# Patient Record
Sex: MALE | Race: WHITE | Employment: OTHER | URBAN - NONMETROPOLITAN AREA
[De-identification: names, ages, dates, MRNs, and addresses within clinical notes are randomized per-mention and may not be internally consistent; named-entity substitution may affect disease eponyms.]

---

## 2022-03-20 PROBLEM — M53.3 SACROILIAC JOINT DYSFUNCTION: Status: ACTIVE | Noted: 2017-01-09

## 2024-05-13 ENCOUNTER — APPOINTMENT (OUTPATIENT)
Age: 69
DRG: 689 | End: 2024-05-13
Payer: MEDICARE

## 2024-05-13 ENCOUNTER — HOSPITAL ENCOUNTER (INPATIENT)
Age: 69
LOS: 2 days | Discharge: ANOTHER HOSPITAL | DRG: 689 | End: 2024-05-15
Attending: EMERGENCY MEDICINE | Admitting: INTERNAL MEDICINE
Payer: MEDICARE

## 2024-05-13 DIAGNOSIS — N39.0 RECURRENT UTI: ICD-10-CM

## 2024-05-13 DIAGNOSIS — E87.0 HYPERNATREMIA: Primary | ICD-10-CM

## 2024-05-13 PROBLEM — R41.82 AMS (ALTERED MENTAL STATUS): Status: ACTIVE | Noted: 2024-05-13

## 2024-05-13 PROBLEM — E87.1 HYPONATREMIA: Status: ACTIVE | Noted: 2024-05-13

## 2024-05-13 LAB
ALBUMIN SERPL-MCNC: 1.5 G/DL (ref 3.4–5)
ALBUMIN/GLOB SERPL: 0.3 (ref 0.8–1.7)
ALP SERPL-CCNC: 178 U/L (ref 45–117)
ALT SERPL-CCNC: 19 U/L (ref 16–61)
AMMONIA PLAS-SCNC: 61 UMOL/L (ref 11–32)
ANION GAP SERPL CALC-SCNC: 10 MMOL/L (ref 3–18)
APPEARANCE UR: ABNORMAL
AST SERPL W P-5'-P-CCNC: 33 U/L (ref 10–38)
BACTERIA URNS QL MICRO: ABNORMAL /HPF
BACTERIA URNS QL MICRO: ABNORMAL /HPF
BASOPHILS # BLD: 0 K/UL (ref 0–0.1)
BASOPHILS NFR BLD: 0 % (ref 0–2)
BILIRUB SERPL-MCNC: 1.9 MG/DL (ref 0.2–1)
BILIRUB UR QL: NEGATIVE
BILIRUB UR QL: NEGATIVE
BUN SERPL-MCNC: 27 MG/DL (ref 7–18)
BUN/CREAT SERPL: 18 (ref 12–20)
CA-I BLD-MCNC: 8.1 MG/DL (ref 8.5–10.1)
CHLORIDE SERPL-SCNC: 77 MMOL/L (ref 100–111)
CO2 SERPL-SCNC: 32 MMOL/L (ref 21–32)
COLOR UR: ABNORMAL
COLOR UR: YELLOW
CREAT SERPL-MCNC: 1.49 MG/DL (ref 0.6–1.3)
DIFFERENTIAL METHOD BLD: ABNORMAL
EOSINOPHIL # BLD: 1.8 K/UL (ref 0–0.4)
EOSINOPHIL NFR BLD: 14 % (ref 0–5)
EPITH CASTS URNS QL MICRO: ABNORMAL /LPF (ref 0–20)
ERYTHROCYTE [DISTWIDTH] IN BLOOD BY AUTOMATED COUNT: 17.1 % (ref 11.6–14.5)
FLUAV AG NPH QL IA: NEGATIVE
FLUBV AG NOSE QL IA: NEGATIVE
GLOBULIN SER CALC-MCNC: 5.5 G/DL (ref 2–4)
GLUCOSE SERPL-MCNC: 107 MG/DL (ref 74–99)
GLUCOSE UR STRIP.AUTO-MCNC: NEGATIVE MG/DL
GLUCOSE UR STRIP.AUTO-MCNC: NEGATIVE MG/DL
HCT VFR BLD AUTO: 24.2 % (ref 36–48)
HGB BLD-MCNC: 8.2 G/DL (ref 13–16)
HGB UR QL STRIP: ABNORMAL
IMM GRANULOCYTES # BLD AUTO: 0 K/UL
IMM GRANULOCYTES NFR BLD AUTO: 0 %
KETONES UR QL STRIP.AUTO: NEGATIVE MG/DL
KETONES UR QL STRIP.AUTO: NEGATIVE MG/DL
LEUKOCYTE ESTERASE UR QL STRIP.AUTO: ABNORMAL
LEUKOCYTE ESTERASE UR QL STRIP.AUTO: ABNORMAL
LYMPHOCYTES # BLD: 1 K/UL (ref 0.9–3.6)
LYMPHOCYTES NFR BLD: 8 % (ref 21–52)
MCH RBC QN AUTO: 29.1 PG (ref 24–34)
MCHC RBC AUTO-ENTMCNC: 33.9 G/DL (ref 31–37)
MCV RBC AUTO: 85.8 FL (ref 78–100)
MONOCYTES # BLD: 0.6 K/UL (ref 0.05–1.2)
MONOCYTES NFR BLD: 5 % (ref 3–10)
NEUTS SEG # BLD: 9.4 K/UL (ref 1.8–8)
NEUTS SEG NFR BLD: 73 % (ref 40–73)
NITRITE UR QL STRIP.AUTO: NEGATIVE
NITRITE UR QL STRIP.AUTO: NEGATIVE
NRBC # BLD: 0 K/UL (ref 0–0.01)
NRBC BLD-RTO: 0 PER 100 WBC
PH UR STRIP: 5.5 (ref 5–8)
PH UR STRIP: 5.5 (ref 5–8)
PLATELET # BLD AUTO: 176 K/UL (ref 135–420)
PMV BLD AUTO: 9.2 FL (ref 9.2–11.8)
POTASSIUM SERPL-SCNC: 3.8 MMOL/L (ref 3.5–5.5)
PROCALCITONIN SERPL-MCNC: 0.21 NG/ML
PROT SERPL-MCNC: 7 G/DL (ref 6.4–8.2)
PROT UR STRIP-MCNC: 30 MG/DL
PROT UR STRIP-MCNC: ABNORMAL MG/DL
RBC # BLD AUTO: 2.82 M/UL (ref 4.35–5.65)
RBC #/AREA URNS HPF: >100 /HPF (ref 0–2)
RBC #/AREA URNS HPF: ABNORMAL /HPF (ref 0–2)
RBC MORPH BLD: ABNORMAL
SARS-COV-2 RDRP RESP QL NAA+PROBE: NOT DETECTED
SODIUM SERPL-SCNC: 119 MMOL/L (ref 136–145)
SP GR UR REFRACTOMETRY: 1.02 (ref 1–1.03)
SP GR UR REFRACTOMETRY: 1.02 (ref 1–1.03)
TROPONIN I SERPL HS-MCNC: 6 NG/L (ref 0–78)
URINE CULTURE IF INDICATED: ABNORMAL
UROBILINOGEN UR QL STRIP.AUTO: 1 EU/DL (ref 0.2–1)
UROBILINOGEN UR QL STRIP.AUTO: 1 EU/DL (ref 0.2–1)
WBC # BLD AUTO: 12.8 K/UL (ref 4.6–13.2)
WBC URNS QL MICRO: ABNORMAL /HPF (ref 0–4)

## 2024-05-13 PROCEDURE — 2000000000 HC ICU R&B

## 2024-05-13 PROCEDURE — 94640 AIRWAY INHALATION TREATMENT: CPT

## 2024-05-13 PROCEDURE — 87040 BLOOD CULTURE FOR BACTERIA: CPT

## 2024-05-13 PROCEDURE — 87635 SARS-COV-2 COVID-19 AMP PRB: CPT

## 2024-05-13 PROCEDURE — 2700000000 HC OXYGEN THERAPY PER DAY

## 2024-05-13 PROCEDURE — 70450 CT HEAD/BRAIN W/O DYE: CPT

## 2024-05-13 PROCEDURE — 85025 COMPLETE CBC W/AUTO DIFF WBC: CPT

## 2024-05-13 PROCEDURE — 6370000000 HC RX 637 (ALT 250 FOR IP): Performed by: INTERNAL MEDICINE

## 2024-05-13 PROCEDURE — P9047 ALBUMIN (HUMAN), 25%, 50ML: HCPCS | Performed by: INTERNAL MEDICINE

## 2024-05-13 PROCEDURE — 51798 US URINE CAPACITY MEASURE: CPT

## 2024-05-13 PROCEDURE — 84484 ASSAY OF TROPONIN QUANT: CPT

## 2024-05-13 PROCEDURE — 80053 COMPREHEN METABOLIC PANEL: CPT

## 2024-05-13 PROCEDURE — 99285 EMERGENCY DEPT VISIT HI MDM: CPT

## 2024-05-13 PROCEDURE — 82140 ASSAY OF AMMONIA: CPT

## 2024-05-13 PROCEDURE — 84145 PROCALCITONIN (PCT): CPT

## 2024-05-13 PROCEDURE — 96374 THER/PROPH/DIAG INJ IV PUSH: CPT

## 2024-05-13 PROCEDURE — 6360000002 HC RX W HCPCS: Performed by: EMERGENCY MEDICINE

## 2024-05-13 PROCEDURE — 87804 INFLUENZA ASSAY W/OPTIC: CPT

## 2024-05-13 PROCEDURE — 51701 INSERT BLADDER CATHETER: CPT

## 2024-05-13 PROCEDURE — 2580000003 HC RX 258: Performed by: INTERNAL MEDICINE

## 2024-05-13 PROCEDURE — 6360000002 HC RX W HCPCS: Performed by: INTERNAL MEDICINE

## 2024-05-13 PROCEDURE — 93005 ELECTROCARDIOGRAM TRACING: CPT | Performed by: EMERGENCY MEDICINE

## 2024-05-13 PROCEDURE — 51702 INSERT TEMP BLADDER CATH: CPT

## 2024-05-13 PROCEDURE — 94761 N-INVAS EAR/PLS OXIMETRY MLT: CPT

## 2024-05-13 PROCEDURE — 71045 X-RAY EXAM CHEST 1 VIEW: CPT

## 2024-05-13 PROCEDURE — 81001 URINALYSIS AUTO W/SCOPE: CPT

## 2024-05-13 PROCEDURE — 2580000003 HC RX 258: Performed by: EMERGENCY MEDICINE

## 2024-05-13 PROCEDURE — 87086 URINE CULTURE/COLONY COUNT: CPT

## 2024-05-13 RX ORDER — SODIUM CHLORIDE 9 MG/ML
INJECTION, SOLUTION INTRAVENOUS PRN
Status: DISCONTINUED | OUTPATIENT
Start: 2024-05-13 | End: 2024-05-15 | Stop reason: HOSPADM

## 2024-05-13 RX ORDER — MIDODRINE HYDROCHLORIDE 5 MG/1
10 TABLET ORAL
Status: DISCONTINUED | OUTPATIENT
Start: 2024-05-13 | End: 2024-05-15 | Stop reason: HOSPADM

## 2024-05-13 RX ORDER — SODIUM CHLORIDE 0.9 % (FLUSH) 0.9 %
5-40 SYRINGE (ML) INJECTION EVERY 12 HOURS SCHEDULED
Status: DISCONTINUED | OUTPATIENT
Start: 2024-05-13 | End: 2024-05-15 | Stop reason: HOSPADM

## 2024-05-13 RX ORDER — ACETAMINOPHEN 650 MG/1
650 SUPPOSITORY RECTAL EVERY 6 HOURS PRN
Status: DISCONTINUED | OUTPATIENT
Start: 2024-05-13 | End: 2024-05-15 | Stop reason: HOSPADM

## 2024-05-13 RX ORDER — GABAPENTIN 300 MG/1
300 CAPSULE ORAL 2 TIMES DAILY
Status: DISCONTINUED | OUTPATIENT
Start: 2024-05-13 | End: 2024-05-15 | Stop reason: HOSPADM

## 2024-05-13 RX ORDER — ENOXAPARIN SODIUM 100 MG/ML
30 INJECTION SUBCUTANEOUS 2 TIMES DAILY
Status: DISCONTINUED | OUTPATIENT
Start: 2024-05-13 | End: 2024-05-15 | Stop reason: HOSPADM

## 2024-05-13 RX ORDER — 0.9 % SODIUM CHLORIDE 0.9 %
30 INTRAVENOUS SOLUTION INTRAVENOUS ONCE
Status: COMPLETED | OUTPATIENT
Start: 2024-05-13 | End: 2024-05-13

## 2024-05-13 RX ORDER — ATORVASTATIN CALCIUM 40 MG/1
40 TABLET, FILM COATED ORAL DAILY
Status: DISCONTINUED | OUTPATIENT
Start: 2024-05-13 | End: 2024-05-15 | Stop reason: HOSPADM

## 2024-05-13 RX ORDER — PANTOPRAZOLE SODIUM 40 MG/1
40 TABLET, DELAYED RELEASE ORAL
Status: DISCONTINUED | OUTPATIENT
Start: 2024-05-14 | End: 2024-05-15 | Stop reason: HOSPADM

## 2024-05-13 RX ORDER — ACETAMINOPHEN 325 MG/1
650 TABLET ORAL EVERY 6 HOURS PRN
Status: DISCONTINUED | OUTPATIENT
Start: 2024-05-13 | End: 2024-05-15 | Stop reason: HOSPADM

## 2024-05-13 RX ORDER — MULTIVITAMIN WITH IRON
1 TABLET ORAL DAILY
Status: DISCONTINUED | OUTPATIENT
Start: 2024-05-13 | End: 2024-05-15 | Stop reason: HOSPADM

## 2024-05-13 RX ORDER — POLYETHYLENE GLYCOL 3350 17 G/17G
17 POWDER, FOR SOLUTION ORAL DAILY PRN
Status: DISCONTINUED | OUTPATIENT
Start: 2024-05-13 | End: 2024-05-15 | Stop reason: HOSPADM

## 2024-05-13 RX ORDER — ALBUMIN (HUMAN) 12.5 G/50ML
25 SOLUTION INTRAVENOUS ONCE
Status: COMPLETED | OUTPATIENT
Start: 2024-05-13 | End: 2024-05-13

## 2024-05-13 RX ORDER — ONDANSETRON 2 MG/ML
4 INJECTION INTRAMUSCULAR; INTRAVENOUS EVERY 6 HOURS PRN
Status: DISCONTINUED | OUTPATIENT
Start: 2024-05-13 | End: 2024-05-15 | Stop reason: HOSPADM

## 2024-05-13 RX ORDER — FUROSEMIDE 20 MG/1
20 TABLET ORAL DAILY
Status: DISCONTINUED | OUTPATIENT
Start: 2024-05-13 | End: 2024-05-15 | Stop reason: HOSPADM

## 2024-05-13 RX ORDER — LACTULOSE 10 G/15ML
30 SOLUTION ORAL 3 TIMES DAILY
Status: DISCONTINUED | OUTPATIENT
Start: 2024-05-13 | End: 2024-05-15 | Stop reason: HOSPADM

## 2024-05-13 RX ORDER — DULOXETIN HYDROCHLORIDE 20 MG/1
40 CAPSULE, DELAYED RELEASE ORAL DAILY
Status: DISCONTINUED | OUTPATIENT
Start: 2024-05-13 | End: 2024-05-15 | Stop reason: HOSPADM

## 2024-05-13 RX ORDER — TAMSULOSIN HYDROCHLORIDE 0.4 MG/1
0.8 CAPSULE ORAL DAILY
Status: DISCONTINUED | OUTPATIENT
Start: 2024-05-13 | End: 2024-05-15 | Stop reason: HOSPADM

## 2024-05-13 RX ORDER — BACLOFEN 10 MG/1
10 TABLET ORAL 3 TIMES DAILY
Status: DISCONTINUED | OUTPATIENT
Start: 2024-05-13 | End: 2024-05-15 | Stop reason: HOSPADM

## 2024-05-13 RX ORDER — ONDANSETRON 4 MG/1
4 TABLET, ORALLY DISINTEGRATING ORAL EVERY 8 HOURS PRN
Status: DISCONTINUED | OUTPATIENT
Start: 2024-05-13 | End: 2024-05-15 | Stop reason: HOSPADM

## 2024-05-13 RX ORDER — SODIUM CHLORIDE 0.9 % (FLUSH) 0.9 %
5-40 SYRINGE (ML) INJECTION PRN
Status: DISCONTINUED | OUTPATIENT
Start: 2024-05-13 | End: 2024-05-15 | Stop reason: HOSPADM

## 2024-05-13 RX ORDER — ASPIRIN 81 MG/1
81 TABLET ORAL DAILY
Status: DISCONTINUED | OUTPATIENT
Start: 2024-05-13 | End: 2024-05-15 | Stop reason: HOSPADM

## 2024-05-13 RX ADMIN — SODIUM CHLORIDE, PRESERVATIVE FREE 10 ML: 5 INJECTION INTRAVENOUS at 21:33

## 2024-05-13 RX ADMIN — WATER 1000 MG: 1 INJECTION INTRAMUSCULAR; INTRAVENOUS; SUBCUTANEOUS at 11:46

## 2024-05-13 RX ADMIN — LACTULOSE 30 G: 10 SOLUTION ORAL at 21:28

## 2024-05-13 RX ADMIN — ALBUMIN (HUMAN) 25 G: 0.25 INJECTION, SOLUTION INTRAVENOUS at 16:34

## 2024-05-13 RX ADMIN — MIDODRINE HYDROCHLORIDE 10 MG: 5 TABLET ORAL at 16:38

## 2024-05-13 RX ADMIN — ATORVASTATIN CALCIUM 40 MG: 40 TABLET, FILM COATED ORAL at 16:38

## 2024-05-13 RX ADMIN — SODIUM CHLORIDE 3741 ML: 9 INJECTION, SOLUTION INTRAVENOUS at 08:20

## 2024-05-13 RX ADMIN — MOMETASONE FUROATE AND FORMOTEROL FUMARATE DIHYDRATE 2 PUFF: 100; 5 AEROSOL RESPIRATORY (INHALATION) at 19:15

## 2024-05-13 RX ADMIN — ASPIRIN 81 MG: 81 TABLET, COATED ORAL at 16:38

## 2024-05-13 RX ADMIN — BACLOFEN 10 MG: 10 TABLET ORAL at 21:27

## 2024-05-13 RX ADMIN — ENOXAPARIN SODIUM 30 MG: 100 INJECTION SUBCUTANEOUS at 21:30

## 2024-05-13 RX ADMIN — THERA TABS 1 TABLET: TAB at 17:33

## 2024-05-13 RX ADMIN — GABAPENTIN 300 MG: 300 CAPSULE ORAL at 21:25

## 2024-05-13 RX ADMIN — LACTULOSE 30 G: 10 SOLUTION ORAL at 16:39

## 2024-05-13 RX ADMIN — BACLOFEN 10 MG: 10 TABLET ORAL at 16:38

## 2024-05-13 RX ADMIN — FUROSEMIDE 20 MG: 20 TABLET ORAL at 16:38

## 2024-05-13 RX ADMIN — DULOXETINE HYDROCHLORIDE 40 MG: 20 CAPSULE, DELAYED RELEASE ORAL at 16:38

## 2024-05-13 RX ADMIN — TAMSULOSIN HYDROCHLORIDE 0.8 MG: 0.4 CAPSULE ORAL at 16:38

## 2024-05-13 ASSESSMENT — PAIN - FUNCTIONAL ASSESSMENT: PAIN_FUNCTIONAL_ASSESSMENT: 0-10

## 2024-05-13 ASSESSMENT — LIFESTYLE VARIABLES
HOW OFTEN DO YOU HAVE A DRINK CONTAINING ALCOHOL: NEVER
HOW MANY STANDARD DRINKS CONTAINING ALCOHOL DO YOU HAVE ON A TYPICAL DAY: PATIENT DOES NOT DRINK

## 2024-05-13 ASSESSMENT — PAIN SCALES - GENERAL
PAINLEVEL_OUTOF10: 0

## 2024-05-13 NOTE — ED PROVIDER NOTES
[facility] EMERGENCY DEPT  EMERGENCY DEPARTMENT ENCOUNTER    Patient Name: Branodn Stanley  MRN: [id number]  YOB: 1955  Provider: Dario Mazariegos DO  PCP: Lopresti, Bartholomew M, MD   Time/Date of evaluation: 8:28 AM EDT on 5/13/24    History of Presenting Illness     History Provided by: Family member  History is limited by: Nothing     HISTORY:   Brandon Stanley is a 68 y.o. male brought in by EMS from home with a chief complaint of fatigue/altered mental status.  Patient has a history of cirrhosis of the liver, chronic hypotension currently on midodrine, and recurrent urinary tract infection.  Patient's wife states that he was recently seen at [facility] 2 days ago.  Patient undergoes weekly paracentesis and his last paracentesis was 4 days ago.  Patient's wife states that he has had decreased appetite and not eating.  Patient also has history of hepatic encephalopathy.    Nursing Notes were all reviewed and agreed with or any disagreements were addressed in the HPI.    Past History     PAST MEDICAL HISTORY:  Past Medical History:   Diagnosis Date    Atrial fibrillation (HCC)     Blood clot in vein     Crushing injury of pelvic region     ED (erectile dysfunction)     Incomplete emptying of bladder     Lower urinary tract symptoms (LUTS)     Male erectile dysfunction, unspecified     Recurrent UTI     Slowing, urinary stream     Urinary retention     Urinary tract infection, site not specified        PAST SURGICAL HISTORY:  Past Surgical History:   Procedure Laterality Date    ANGIOPLASTY  8/2014    APPENDECTOMY  5/2001    COLONOSCOPY  02/07/2015    CORONARY ANGIOPLASTY WITH STENT PLACEMENT  1/2011    FRACTURE SURGERY  01/25/2015    HERNIA REPAIR  2005    OTHER SURGICAL HISTORY  1/2015    Multiple surgieries [facility]    OTHER SURGICAL HISTORY  01/25/2015    APPLICATION EXTERNAL FIXATOR    OTHER SURGICAL HISTORY  01/09/2017    PELVIS/ HIP JOINT-ARTHRODESIS    OTHER SURGICAL HISTORY  01/25/2015

## 2024-05-13 NOTE — H&P
HISTORY AND PHYSICAL EXAMINATION    Admit Date: 5/13/2024  Date of Note:  @TDR@  Assessment / Plans     Principle Problems:  Metabolic encephalopathy  Hyperammoniemia   Hyponatremia - chronic   Advanced Liver Cirrhosis  Ascites   - Presented with complain of confuse, AMS, paracentesis and followed weekly at [facility]   - may related to hyperammonia vs drug induced   - In the ED afebrile, hypotensive, O2 sat 97%, Na 119, NH3 61, Cr 1.49, albumin 1.5, Liver enzyme normal, bili 1.9, WBC 12K, Hgb 8.2, CT head no acute process,   - Admitted to ICU, tele monitor, fluid restriction , IV albumin, Diuresis, monitor Lytes, Rocephin, lactulose  - the family want him to go to [facility] but the EMS brought him her and they agree with admission for observation and monitor.   Hypotension - on Midodrine   HLP - on statin  GERD - on PPI  Chronic pain- cont' with home regimen  COPD - on inhalers   BPH- on Flomax     Code Status: No Order   DVT prophylaxis: pharmacologic and mechanical    Attestation : Brandon Stanley is being admitted for the above principle medical problem.   The patient  is at risk for decompensation and  will require a time span of at least 2 midnights in hospital to continue workup and ensure medical stablility.  Complex decision making was performed, which includes reviewing the patient's available past medical records, laboratory results, and x-ray films.    Subjective:   PCP: Lopresti, Bartholomew M, MD    CC: Senthil STANLEY is a 68 y.o. male     The patient PMH significant for advanced liver cirrhosis, Ascites, malnutrition, hypotension  Presented to [facility] with complain of  AMS, paracentesis and followed weekly at [facility] , per family  Denies recent trauma, denies fever or chills, denies cough or sputum, denies chest pain or palpitation,  Denies SOB or MEYER, denies GI/ symptoms, denies Headache, facial droop or focal weakness/numbness to extremities.  No other symptoms on the review of systems. Other

## 2024-05-13 NOTE — PROGRESS NOTES
1245- ICU bed 1 set up    1250- TRANSFER - IN REPORT:    Verbal report received from Sammi miramontes Brandon Stanley  being received from ER for change in patient condition (Hyponatremia)      Report consisted of patient's Situation, Background, Assessment and   Recommendations(SBAR).     Information from the following report(s) Nurse Handoff Report, Index, ED Encounter Summary, ED SBAR, Adult Overview, Intake/Output, MAR, Recent Results, Med Rec Status, Cardiac Rhythm SR, and Alarm Parameters was reviewed with the receiving nurse.    Opportunity for questions and clarification was provided.      1220- Assessment completed upon patient's arrival to unit and care assumed.      1650- bladder scan done - 289 ml scanned - unable to void spontaneously - notified Dr Witt - order received for fernandez cath, education provided for spouse and client.    1700- fernandez inserted, tolerated well, ginny clear urine, urine collected for lab, assisted with dinner, med's given crushed and with apple sauce.     1720- set up for dinner, total assist.     1900- report given.

## 2024-05-13 NOTE — ED NOTES
TRANSFER - OUT REPORT:    Verbal report given to Sahara READ on Brandon Stanley  being transferred to ICU #1 for routine progression of patient care       Report consisted of patient's Situation, Background, Assessment and   Recommendations(SBAR).     Information from the following report(s) Nurse Handoff Report, Intake/Output, MAR, Recent Results, and Cardiac Rhythm SR  was reviewed with the receiving nurse.    Rowland Fall Assessment:    Presents to emergency department  because of falls (Syncope, seizure, or loss of consciousness): No  Age > 70: No  Altered Mental Status, Intoxication with alcohol or substance confusion (Disorientation, impaired judgment, poor safety awaremess, or inability to follow instructions): No  Impaired Mobility: Ambulates or transfers with assistive devices or assistance; Unable to ambulate or transer.: No  Nursing Judgement: No          Lines:   Peripheral IV 05/13/24 Left Antecubital (Active)   Site Assessment Clean, dry & intact 05/13/24 0801   Line Status Blood return noted 05/13/24 0801        Opportunity for questions and clarification was provided.      Patient transported with:  Monitor, O2 @ 2lpm, and Registered Nurse

## 2024-05-13 NOTE — PROGRESS NOTES
Comprehensive Nutrition Assessment    Type and Reason for Visit:  Initial    Nutrition Recommendations/Plan:   Continue current diet and encourage oral intake of 50% or more of most meals  Begin Ensure HP daily to promote protein intake and meet estimated needs  Consider daily MVI+m      Malnutrition Assessment:  Malnutrition Status:  No malnutrition (05/13/24 1653)    Context:  Acute Illness       Nutrition Assessment:    69 yo male admits with PMH that includes a fib, recurrent UTIs, appears obese, with genrealized swelling, is currently in ICU for infection recovery. Does state on admission poor PO intake for past few days r/t malaise. No evidence of current weight loss, UBW: 275 lbs    Nutrition Related Findings:      Wound Type: None       Lab Results   Component Value Date     (LL) 05/13/2024    K 3.8 05/13/2024    CL 77 (L) 05/13/2024    CO2 32 05/13/2024    BUN 27 (H) 05/13/2024    CREATININE 1.49 (H) 05/13/2024    GLUCOSE 107 (H) 05/13/2024    CALCIUM 8.1 (L) 05/13/2024    BILITOT 1.9 (H) 05/13/2024    ALKPHOS 178 (H) 05/13/2024    AST 33 05/13/2024    ALT 19 05/13/2024    LABGLOM 51 (L) 05/13/2024    GLOB 5.5 (H) 05/13/2024           Nutrition History and Allergies:   No known food allergies      Current Nutrition Intake & Therapies:    Average Meal Intake: 51-75%  Average Supplements Intake: None Ordered  ADULT DIET; Regular; Low Sodium (2 gm)    Anthropometric Measures:  Height: 177.8 cm (5' 10\")  Ideal Body Weight (IBW): 166 lbs (75 kg)    Admission Body Weight: 124.7 kg (274 lb 14.6 oz)  Current Body Weight: 124.7 kg (274 lb 14.6 oz), 165.6 % IBW. Weight Source: Bed Scale  Current BMI (kg/m2): 39.4  Usual Body Weight: 124.7 kg (275 lb)  % Weight Change (Calculated): 0  BMI Categories: Obese Class 2 (BMI 35.0 -39.9)    Estimated Daily Nutrient Needs:  Energy Requirements Based On: Kcal/kg  Weight Used for Energy Requirements: Current  Energy (kcal/day): 0279-8585  Weight Used for Protein

## 2024-05-14 ENCOUNTER — APPOINTMENT (OUTPATIENT)
Age: 69
DRG: 689 | End: 2024-05-14
Payer: MEDICARE

## 2024-05-14 LAB
ALBUMIN SERPL-MCNC: 1.5 G/DL (ref 3.4–5)
ALBUMIN/GLOB SERPL: 0.3 (ref 0.8–1.7)
ALP SERPL-CCNC: 146 U/L (ref 45–117)
ALT SERPL-CCNC: 17 U/L (ref 16–61)
AMMONIA PLAS-SCNC: 21 UMOL/L (ref 11–32)
AMMONIA PLAS-SCNC: 35 UMOL/L (ref 11–32)
ANION GAP SERPL CALC-SCNC: 9 MMOL/L (ref 3–18)
ANION GAP SERPL CALC-SCNC: 9 MMOL/L (ref 3–18)
AST SERPL W P-5'-P-CCNC: 28 U/L (ref 10–38)
BASOPHILS # BLD: 0 K/UL (ref 0–0.1)
BASOPHILS NFR BLD: 0 % (ref 0–2)
BILIRUB SERPL-MCNC: 1.1 MG/DL (ref 0.2–1)
BUN SERPL-MCNC: 24 MG/DL (ref 7–18)
BUN SERPL-MCNC: 28 MG/DL (ref 7–18)
BUN/CREAT SERPL: 21 (ref 12–20)
BUN/CREAT SERPL: 23 (ref 12–20)
CA-I BLD-MCNC: 8 MG/DL (ref 8.5–10.1)
CHLORIDE SERPL-SCNC: 83 MMOL/L (ref 100–111)
CHLORIDE SERPL-SCNC: 85 MMOL/L (ref 100–111)
CO2 SERPL-SCNC: 29 MMOL/L (ref 21–32)
CO2 SERPL-SCNC: 30 MMOL/L (ref 21–32)
CREAT SERPL-MCNC: 1.16 MG/DL (ref 0.6–1.3)
CREAT SERPL-MCNC: 1.22 MG/DL (ref 0.6–1.3)
DIFFERENTIAL METHOD BLD: ABNORMAL
EKG ATRIAL RATE: 86 BPM
EKG DIAGNOSIS: NORMAL
EKG P AXIS: 2 DEGREES
EKG P-R INTERVAL: 140 MS
EKG Q-T INTERVAL: 370 MS
EKG QRS DURATION: 82 MS
EKG QTC CALCULATION (BAZETT): 442 MS
EKG R AXIS: 36 DEGREES
EKG T AXIS: 27 DEGREES
EKG VENTRICULAR RATE: 86 BPM
EOSINOPHIL # BLD: 0.9 K/UL (ref 0–0.4)
EOSINOPHIL NFR BLD: 10 % (ref 0–5)
GLOBULIN SER CALC-MCNC: 4.5 G/DL (ref 2–4)
GLUCOSE SERPL-MCNC: 100 MG/DL (ref 74–99)
GLUCOSE SERPL-MCNC: 103 MG/DL (ref 74–99)
HCT VFR BLD AUTO: 23.4 % (ref 36–48)
IMM GRANULOCYTES # BLD AUTO: 0 K/UL
IMM GRANULOCYTES NFR BLD AUTO: 0 %
INR PPP: 1.3 (ref 0.9–1.1)
LYMPHOCYTES # BLD: 0.7 K/UL (ref 0.9–3.6)
LYMPHOCYTES NFR BLD: 8 % (ref 21–52)
MCH RBC QN AUTO: 29.4 PG (ref 24–34)
MCV RBC AUTO: 87 FL (ref 78–100)
MONOCYTES NFR BLD: 2 % (ref 3–10)
NEUTS SEG # BLD: 7.3 K/UL (ref 1.8–8)
NEUTS SEG NFR BLD: 79 % (ref 40–73)
NRBC # BLD: 0 K/UL (ref 0–0.01)
NRBC BLD-RTO: 0 PER 100 WBC
PLATELET # BLD AUTO: 161 K/UL (ref 135–420)
PMV BLD AUTO: 9.7 FL (ref 9.2–11.8)
POTASSIUM SERPL-SCNC: 3.8 MMOL/L (ref 3.5–5.5)
POTASSIUM SERPL-SCNC: 3.9 MMOL/L (ref 3.5–5.5)
PROT SERPL-MCNC: 6 G/DL (ref 6.4–8.2)
PROTHROMBIN TIME: 16.4 SEC (ref 11.9–14.7)
RBC # BLD AUTO: 2.69 M/UL (ref 4.35–5.65)
RBC MORPH BLD: ABNORMAL
SODIUM SERPL-SCNC: 123 MMOL/L (ref 136–145)
WBC # BLD AUTO: 9.1 K/UL (ref 4.6–13.2)

## 2024-05-14 PROCEDURE — 94640 AIRWAY INHALATION TREATMENT: CPT

## 2024-05-14 PROCEDURE — 80048 BASIC METABOLIC PNL TOTAL CA: CPT

## 2024-05-14 PROCEDURE — 80053 COMPREHEN METABOLIC PANEL: CPT

## 2024-05-14 PROCEDURE — 2580000003 HC RX 258: Performed by: NURSE PRACTITIONER

## 2024-05-14 PROCEDURE — 85025 COMPLETE CBC W/AUTO DIFF WBC: CPT

## 2024-05-14 PROCEDURE — 6370000000 HC RX 637 (ALT 250 FOR IP): Performed by: INTERNAL MEDICINE

## 2024-05-14 PROCEDURE — 85610 PROTHROMBIN TIME: CPT

## 2024-05-14 PROCEDURE — 2000000000 HC ICU R&B

## 2024-05-14 PROCEDURE — 74018 RADEX ABDOMEN 1 VIEW: CPT

## 2024-05-14 PROCEDURE — 36415 COLL VENOUS BLD VENIPUNCTURE: CPT

## 2024-05-14 PROCEDURE — 2700000000 HC OXYGEN THERAPY PER DAY

## 2024-05-14 PROCEDURE — 2580000003 HC RX 258: Performed by: INTERNAL MEDICINE

## 2024-05-14 PROCEDURE — P9047 ALBUMIN (HUMAN), 25%, 50ML: HCPCS | Performed by: NURSE PRACTITIONER

## 2024-05-14 PROCEDURE — 82140 ASSAY OF AMMONIA: CPT

## 2024-05-14 PROCEDURE — 6360000002 HC RX W HCPCS: Performed by: INTERNAL MEDICINE

## 2024-05-14 PROCEDURE — 6360000002 HC RX W HCPCS: Performed by: NURSE PRACTITIONER

## 2024-05-14 PROCEDURE — 94761 N-INVAS EAR/PLS OXIMETRY MLT: CPT

## 2024-05-14 RX ORDER — SODIUM CHLORIDE 9 MG/ML
INJECTION, SOLUTION INTRAVENOUS CONTINUOUS
Status: DISCONTINUED | OUTPATIENT
Start: 2024-05-14 | End: 2024-05-14

## 2024-05-14 RX ORDER — ALBUMIN (HUMAN) 12.5 G/50ML
25 SOLUTION INTRAVENOUS ONCE
Status: COMPLETED | OUTPATIENT
Start: 2024-05-14 | End: 2024-05-14

## 2024-05-14 RX ORDER — 0.9 % SODIUM CHLORIDE 0.9 %
250 INTRAVENOUS SOLUTION INTRAVENOUS ONCE
Status: COMPLETED | OUTPATIENT
Start: 2024-05-14 | End: 2024-05-14

## 2024-05-14 RX ADMIN — LACTULOSE 30 G: 10 SOLUTION ORAL at 08:05

## 2024-05-14 RX ADMIN — MOMETASONE FUROATE AND FORMOTEROL FUMARATE DIHYDRATE 2 PUFF: 100; 5 AEROSOL RESPIRATORY (INHALATION) at 07:16

## 2024-05-14 RX ADMIN — MIDODRINE HYDROCHLORIDE 10 MG: 5 TABLET ORAL at 22:47

## 2024-05-14 RX ADMIN — SODIUM CHLORIDE: 9 INJECTION, SOLUTION INTRAVENOUS at 17:29

## 2024-05-14 RX ADMIN — MIDODRINE HYDROCHLORIDE 10 MG: 5 TABLET ORAL at 08:06

## 2024-05-14 RX ADMIN — ALBUMIN (HUMAN) 25 G: 0.25 INJECTION, SOLUTION INTRAVENOUS at 18:34

## 2024-05-14 RX ADMIN — ENOXAPARIN SODIUM 30 MG: 100 INJECTION SUBCUTANEOUS at 09:08

## 2024-05-14 RX ADMIN — PANTOPRAZOLE SODIUM 40 MG: 40 TABLET, DELAYED RELEASE ORAL at 06:20

## 2024-05-14 RX ADMIN — SODIUM CHLORIDE 250 ML: 9 INJECTION, SOLUTION INTRAVENOUS at 12:12

## 2024-05-14 RX ADMIN — LACTULOSE 30 G: 10 SOLUTION ORAL at 22:48

## 2024-05-14 RX ADMIN — SODIUM CHLORIDE, PRESERVATIVE FREE 10 ML: 5 INJECTION INTRAVENOUS at 22:48

## 2024-05-14 RX ADMIN — BACLOFEN 10 MG: 10 TABLET ORAL at 08:07

## 2024-05-14 RX ADMIN — THERA TABS 1 TABLET: TAB at 08:07

## 2024-05-14 RX ADMIN — Medication 2 PUFF: at 07:16

## 2024-05-14 RX ADMIN — ASPIRIN 81 MG: 81 TABLET, COATED ORAL at 09:09

## 2024-05-14 RX ADMIN — TAMSULOSIN HYDROCHLORIDE 0.8 MG: 0.4 CAPSULE ORAL at 08:05

## 2024-05-14 RX ADMIN — SODIUM CHLORIDE 250 ML: 9 INJECTION, SOLUTION INTRAVENOUS at 16:19

## 2024-05-14 RX ADMIN — ATORVASTATIN CALCIUM 40 MG: 40 TABLET, FILM COATED ORAL at 08:08

## 2024-05-14 RX ADMIN — WATER 1000 MG: 1 INJECTION INTRAMUSCULAR; INTRAVENOUS; SUBCUTANEOUS at 11:07

## 2024-05-14 RX ADMIN — FUROSEMIDE 20 MG: 20 TABLET ORAL at 08:06

## 2024-05-14 RX ADMIN — GABAPENTIN 300 MG: 300 CAPSULE ORAL at 08:07

## 2024-05-14 RX ADMIN — DULOXETINE HYDROCHLORIDE 40 MG: 20 CAPSULE, DELAYED RELEASE ORAL at 08:07

## 2024-05-14 ASSESSMENT — PAIN SCALES - GENERAL
PAINLEVEL_OUTOF10: 0

## 2024-05-14 NOTE — PROGRESS NOTES
0645- assumed care and received report.    0700- Client lethargic, drifts off, eye rolling noted, skin pale and jaundice, responds to voice, appears weak, total assist in all ADL's. Swelling in legs arms/+1 pitting edema - extremities elevated on pillow, fernandez in place draining ginny/orange urine - hazy, brief clean, call bell in reach, bed alarm on.     0730- set up for breakfast - unable to eat a large amount due to reduced mental status. Total assist with eating food. Wife at bedside. Questions and concerns of wife answered - emotional support given.    0807- med's crushed given with apple sauce - able to swallow.     0840- Called provider to speak to wife about concerns.     0930- Bath done and linen change.    1115- Reassessment done, AMS, lethargic, reduced urine output - made Dr Witt aware - initiate transfer. Family aware, at bedside.   Unable to give anything orally at this time due to AMS - high aspiration risk.     1200- BP dropping - notified NP and MD - at bedside, given bolus, SV at bedside for second iv. Very poor vein access.       1230- Accepting provider at [facility] - waiting on bed - wife and daughter at bedside.   1229   Physician Acceptance Marked complete for Destination [facility]   1230   Call from   fl #   [phone]   Entered By: Ginny Banks, RN            Call from Breann Long MD   [No callback number listed]   Breann Long MD Approved Transfer to [facility]   Breann Long MD Marked as Admitting Provider to [facility]   Contact Role: Accepting Provider   Entered By: A        1530- large BM - incontinence care done, mepilex foam applied to buttocks for skin protection, repositioned, call bell in reach, bed alarm on. No bed available yet at [facility] - family aware - at bedside.    1600- Made hospitalist aware of hypotension - order for bolus received, med's on hold at this time due to reduced alertness and

## 2024-05-14 NOTE — PROGRESS NOTES
In depth conversation had with the patient's wife at the bedside, concerning the patient wishes and code status, spouse has decided to change code status to DNR, DNR was signed at the bedside.

## 2024-05-14 NOTE — PROGRESS NOTES
INTERNAL MEDICINE PROGRESS NOTE      Patient: Brandon Stanley   YOB: 1955   MRN: [id number]      Hospital course / Assessment and Plans   Principle Problems:  Metabolic encephalopathy  Hyperammoniemia   Hyponatremia - chronic , Hypervolemic   Advanced Liver Cirrhosis  Ascites  Polypharmacy   - Presented with complain of confuse, AMS, paracentesis and followed weekly at [facility]   - may related to hyperammonia vs Polypharmacy   - In the ED afebrile, hypotensive, O2 sat 97%, Na 119, NH3 61, Cr 1.49, albumin 1.5, Liver enzyme normal, bili 1.9, WBC 12K, Hgb 8.2, CT head no acute process,   - Admitted to ICU, tele monitor, fluid restriction , IV albumin, Diuresis, monitor Lytes, Rocephin, lactulose  - the family want him to go to [facility] but the EMS brought him her and they agree with admission for observation and monitor.   - hold Narcotics, fentanyl and gabapentin for now   - Avoid hepatotoxin   - Alert and oriented, AMS resolved, Na improving slowly, NH3 improving, BP improved  - Instructed to cont' with diuretics and free water restriction  - Also instructed to follow up PCP/Pain clinic to de-escalate pain medication   - the family want the patient to be transferred to [facility]         Hypotension - on Midodrine   HLP - on statin  GERD - on PPI  Chronic pain- cont' with home regimen  COPD - on inhalers   BPH- on Flomax       Full Code   DVT prophylaxis: pharmacologic and mechanical    Disposition / Plans   Disposition: may home in AM if clinically cont' to improve and back to baseline , lab in Am       Subjective / ROS:   Patient states he feel better      Medical Decision Making   Chart, Images and Lab data reviewed, necessary medical Orders placed   Discussed with nursing staff     Vitals:    05/14/24 0700 05/14/24 0717 05/14/24 0719 05/14/24 0800   BP: (!) 107/51   (!) 109/53   Pulse: 79 82 82 89   Resp: 15 20 20 19   Temp: 97.1 °F (36.2 °C)   97.1 °F (36.2 °C)   TempSrc: Axillary   Axillary   SpO2: 99% 99%

## 2024-05-14 NOTE — DISCHARGE SUMMARY
Lincoln Witt MD, 300 mg at 05/14/24 0807    midodrine (PROAMATINE) tablet 10 mg, 10 mg, Oral, TID WC, Lincoln Witt MD, 10 mg at 05/14/24 0806    tamsulosin (FLOMAX) capsule 0.8 mg, 0.8 mg, Oral, Daily, Lincoln Witt MD, 0.8 mg at 05/14/24 0805    tiotropium (SPIRIVA RESPIMAT) 2.5 MCG/ACT inhaler 2 puff, 2 puff, Inhalation, Daily RT, Lincoln Witt MD, 2 puff at 05/14/24 0716    lactulose (CHRONULAC) 10 GM/15ML solution 30 g, 30 g, Oral, TID, Lincoln Witt MD, 30 g at 05/14/24 0805    sodium chloride flush 0.9 % injection 5-40 mL, 5-40 mL, IntraVENous, 2 times per day, Lincoln Witt MD, 10 mL at 05/13/24 2133    sodium chloride flush 0.9 % injection 5-40 mL, 5-40 mL, IntraVENous, PRN, Lincoln Witt MD    0.9 % sodium chloride infusion, , IntraVENous, PRN, Lincoln Witt MD    enoxaparin Sodium (LOVENOX) injection 30 mg, 30 mg, SubCUTAneous, BID, Lincoln Witt MD, 30 mg at 05/14/24 0908    ondansetron (ZOFRAN-ODT) disintegrating tablet 4 mg, 4 mg, Oral, Q8H PRN **OR** ondansetron (ZOFRAN) injection 4 mg, 4 mg, IntraVENous, Q6H PRN, Lincoln Witt MD    polyethylene glycol (GLYCOLAX) packet 17 g, 17 g, Oral, Daily PRN, Lincoln Witt MD    acetaminophen (TYLENOL) tablet 650 mg, 650 mg, Oral, Q6H PRN **OR** acetaminophen (TYLENOL) suppository 650 mg, 650 mg, Rectal, Q6H PRN, Lincoln Witt MD    cefTRIAXone (ROCEPHIN) 1,000 mg in sterile water 10 mL IV syringe, 1,000 mg, IntraVENous, Q24H, Lincoln Witt, MD, 1,000 mg at 05/14/24 1107    multivitamin 1 tablet, 1 tablet, Oral, Daily, Lincoln Witt MD, 1 tablet at 05/14/24 0807     Patient Follow Up Instructions:   Activity: bedrest  Diet:   NPO until more alert    Condition at Discharge:  Stable  __________________________________________________________________    Disposition: [facility]    ____________________________________________________________________    Code Status:

## 2024-05-14 NOTE — PROGRESS NOTES
conducted a Follow up consultation and Spiritual Assessment for Brandon Stanley, who is a 68 y.o.,male.      The  provided the following Interventions:  Continued the relationship of care and support.   Listened empathically.  Offered assurance of continued prayer on patients behalf.   Chart reviewed.    The following outcomes were achieved:  Patient's family expressed gratitude for 's visit.    Assessment:  There are no further spiritual or Mandaen issues which require Spiritual Care Services interventions at this time.     Plan:  Chaplains will continue to follow and will provide pastoral care on an as needed/requested basis.   recommends bedside caregivers page  on duty if patient shows signs of acute spiritual or emotional distress.        Audelia Stanley   Spiritual Care   [phone]

## 2024-05-14 NOTE — PROGRESS NOTES
conducted an initial consultation and Spiritual Assessment for Brandon Stanley, who is a 68 y.o.,male. Patient’s Primary Language is: English.   According to the patient’s EMR Moravian Affiliation is: Yarsani.     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    Lumbosacral plexopathy 07/12/2016    Slowing, urinary stream     Incomplete emptying of bladder     Aneurysm artery, 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    Abdominal aortic aneurysm without rupture ([facility]) 09/22/2015    Pain of lower extremity 06/30/2015    Atrial fibrillation ([facility])     Crushing injury of pelvic region     Urinary retention     Recurrent UTI     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   Explored issues of bird, belief, spirituality and Adventism/ritual needs while hospitalized.  Listened empathically.  Provided chaplaincy education.  Provided information about Spiritual Care Services.  Offered assurance of continued prayers on patient's behalf.   Chart

## 2024-05-14 NOTE — PROGRESS NOTES
Admission Medication Reconciliation:    Information obtained from:  Patient's fill hx/ pharmacy    Comments/Recommendations: Reviewed PTA medications and patient's allergies.    Reviewed and updated        Allergies:  Adhesive tape, Bee venom, and Hydromorphone    Significant PMH/Disease States:   Past Medical History:   Diagnosis Date    Atrial fibrillation (HCC)     Blood clot in vein     Crushing injury of pelvic region     ED (erectile dysfunction)     Incomplete emptying of bladder     Lower urinary tract symptoms (LUTS)     Male erectile dysfunction, unspecified     Recurrent UTI     Slowing, urinary stream     Urinary retention     Urinary tract infection, site not specified      Chief Complaint for this Admission:    Chief Complaint   Patient presents with    Fatigue     Prior to Admission Medications:   Prior to Admission medications    Medication Sig Start Date End Date Taking? Authorizing Provider   aspirin 81 MG EC tablet 1 tablet 4/15/15   Automatic Reconciliation, Ar   atorvastatin (LIPITOR) 40 MG tablet Take by mouth daily    Automatic Reconciliation, Ar   baclofen (LIORESAL) 10 MG tablet Take 0.5 tablets by mouth 2 times daily as needed    Automatic Reconciliation, Ar   vitamin D 25 MCG (1000 UT) CAPS Take 1 tablet by mouth daily    Automatic Reconciliation, Ar   diclofenac (FLECTOR) 1.3 % PTCH patch Place 180 mg onto the skin    Automatic Reconciliation, Ar   DULoxetine (CYMBALTA) 60 MG extended release capsule Take 1 capsule by mouth daily    Automatic Reconciliation, Ar   esomeprazole (NEXIUM) 40 MG delayed release capsule TK 1 C PO QD PRN 7/20/17   Automatic Reconciliation, Ar   fluticasone furoate-vilanterol (BREO ELLIPTA) 200-25 MCG/ACT AEPB inhaler Inhale 1 puff into the lungs 12/4/19   Automatic Reconciliation, Ar   furosemide (LASIX) 40 MG tablet Take 0.5 tablets by mouth 10/21/19   Automatic Reconciliation, Ar   gabapentin (NEURONTIN) 400 MG capsule Take 1 capsule by mouth. Take 2 capsules

## 2024-05-14 NOTE — PROGRESS NOTES
Physical Therapy  Orders received for P.T., therapist discussed pt with wife. She states he has been bedbound for several years, but he was able to ambulate with a RW until around 12/2023. He has not been out of bed since then and he is steadily declining. She reports no current P.T. needs and agrees to discontinue order at this time.  Bairon Walls, PT, DPT

## 2024-05-14 NOTE — PROGRESS NOTES
-1850 Received care of pt from off going nurse.   Fernandez tubing disconnected.  Fernandez tubing reconnected.    -1950 PM Assessment completed.  A&OX4.  Resp even and non-labored.  Rhonchi noted in upper lobes, diminished in bases. O2 @ 2 lpm via n/c, SATS 97%.  Pt has non-productive cough.  Skin warm and dry.  Pt has multiple scabs and generalized bruising.  Fernandez tubing disconnected.  Fernandez removed.  Sterile technique used to reinserted #16 fr fernandez patent, draining dark ginny colored urine.   Pt turned and repositioned for comfort.    -2000 Dr Witt notified of pt fernandez being changed.    -2127 Pt took po pm meds in applesauce.  Pt turned and repositioned for comfort.  CBWR, bed in lowest position.    -0005 Pt resting quietly in bed with eyes closed.  NAD noted.     -0205 Pt incontinent of stool.  Pt cleaned, aloe vesta ointment applied to groin and sacrum.  Pt turned and repositioned for comfort.        -0315  into draw am labs.     -0500 Hourly rounding done at this time.  Pt resting quietly in bed.  VSS.      -0620 Scheduled po am meds given in applesauce.    -0700 Bedside shift change report given to MICHAEL Montelongo RN (oncoming nurse) by GERMÁN Gutierrez RN (offgoing nurse). Report included the following information Intake/Output, MAR, Recent Results, and Cardiac Rhythm NSR .

## 2024-05-15 VITALS
RESPIRATION RATE: 17 BRPM | HEART RATE: 90 BPM | BODY MASS INDEX: 32.84 KG/M2 | SYSTOLIC BLOOD PRESSURE: 121 MMHG | TEMPERATURE: 99 F | WEIGHT: 229.4 LBS | OXYGEN SATURATION: 99 % | HEIGHT: 70 IN | DIASTOLIC BLOOD PRESSURE: 56 MMHG

## 2024-05-15 LAB
AMMONIA PLAS-SCNC: 19 UMOL/L (ref 11–32)
ANION GAP SERPL CALC-SCNC: 8 MMOL/L (ref 3–18)
BACTERIA SPEC CULT: NORMAL
BASOPHILS # BLD: 0 K/UL (ref 0–0.1)
BASOPHILS NFR BLD: 1 % (ref 0–2)
BUN SERPL-MCNC: 21 MG/DL (ref 7–18)
BUN/CREAT SERPL: 19 (ref 12–20)
CA-I BLD-MCNC: 8.1 MG/DL (ref 8.5–10.1)
CHLORIDE SERPL-SCNC: 86 MMOL/L (ref 100–111)
CO2 SERPL-SCNC: 32 MMOL/L (ref 21–32)
COLONY COUNT, CNT: NORMAL
COLONY COUNT, CNT: NORMAL
CREAT SERPL-MCNC: 1.13 MG/DL (ref 0.6–1.3)
DIFFERENTIAL METHOD BLD: ABNORMAL
EOSINOPHIL # BLD: 0.9 K/UL (ref 0–0.4)
EOSINOPHIL NFR BLD: 12 % (ref 0–5)
ERYTHROCYTE [DISTWIDTH] IN BLOOD BY AUTOMATED COUNT: 17.2 % (ref 11.6–14.5)
GLUCOSE SERPL-MCNC: 85 MG/DL (ref 74–99)
HCT VFR BLD AUTO: 19.4 % (ref 36–48)
HGB BLD-MCNC: 6.6 G/DL (ref 13–16)
IMM GRANULOCYTES # BLD AUTO: 0.1 K/UL (ref 0–0.04)
IMM GRANULOCYTES NFR BLD AUTO: 1 % (ref 0–0.5)
LYMPHOCYTES # BLD: 0.9 K/UL (ref 0.9–3.6)
LYMPHOCYTES NFR BLD: 12 % (ref 21–52)
Lab: NORMAL
MCH RBC QN AUTO: 29.6 PG (ref 24–34)
MCHC RBC AUTO-ENTMCNC: 34 G/DL (ref 31–37)
MCV RBC AUTO: 87 FL (ref 78–100)
MONOCYTES # BLD: 0.7 K/UL (ref 0.05–1.2)
MONOCYTES NFR BLD: 10 % (ref 3–10)
NEUTS SEG # BLD: 4.9 K/UL (ref 1.8–8)
NEUTS SEG NFR BLD: 64 % (ref 40–73)
NRBC # BLD: 0 K/UL (ref 0–0.01)
NRBC BLD-RTO: 0 PER 100 WBC
PLATELET # BLD AUTO: 135 K/UL (ref 135–420)
PMV BLD AUTO: 8.8 FL (ref 9.2–11.8)
POTASSIUM SERPL-SCNC: 3.7 MMOL/L (ref 3.5–5.5)
RBC # BLD AUTO: 2.23 M/UL (ref 4.35–5.65)
SODIUM SERPL-SCNC: 126 MMOL/L (ref 136–145)
WBC # BLD AUTO: 7.5 K/UL (ref 4.6–13.2)

## 2024-05-15 PROCEDURE — 2580000003 HC RX 258: Performed by: INTERNAL MEDICINE

## 2024-05-15 PROCEDURE — 82140 ASSAY OF AMMONIA: CPT

## 2024-05-15 PROCEDURE — 94761 N-INVAS EAR/PLS OXIMETRY MLT: CPT

## 2024-05-15 PROCEDURE — 94640 AIRWAY INHALATION TREATMENT: CPT

## 2024-05-15 PROCEDURE — 36415 COLL VENOUS BLD VENIPUNCTURE: CPT

## 2024-05-15 PROCEDURE — 85025 COMPLETE CBC W/AUTO DIFF WBC: CPT

## 2024-05-15 PROCEDURE — 80048 BASIC METABOLIC PNL TOTAL CA: CPT

## 2024-05-15 PROCEDURE — 6370000000 HC RX 637 (ALT 250 FOR IP): Performed by: INTERNAL MEDICINE

## 2024-05-15 PROCEDURE — 2700000000 HC OXYGEN THERAPY PER DAY

## 2024-05-15 PROCEDURE — 2580000003 HC RX 258: Performed by: NURSE PRACTITIONER

## 2024-05-15 PROCEDURE — 6360000002 HC RX W HCPCS: Performed by: INTERNAL MEDICINE

## 2024-05-15 RX ORDER — 0.9 % SODIUM CHLORIDE 0.9 %
250 INTRAVENOUS SOLUTION INTRAVENOUS ONCE
Status: COMPLETED | OUTPATIENT
Start: 2024-05-15 | End: 2024-05-15

## 2024-05-15 RX ADMIN — WATER 1000 MG: 1 INJECTION INTRAMUSCULAR; INTRAVENOUS; SUBCUTANEOUS at 09:35

## 2024-05-15 RX ADMIN — SODIUM CHLORIDE, PRESERVATIVE FREE 10 ML: 5 INJECTION INTRAVENOUS at 09:40

## 2024-05-15 RX ADMIN — FUROSEMIDE 20 MG: 20 TABLET ORAL at 09:18

## 2024-05-15 RX ADMIN — MIDODRINE HYDROCHLORIDE 10 MG: 5 TABLET ORAL at 09:18

## 2024-05-15 RX ADMIN — ASPIRIN 81 MG: 81 TABLET, COATED ORAL at 09:18

## 2024-05-15 RX ADMIN — ENOXAPARIN SODIUM 30 MG: 100 INJECTION SUBCUTANEOUS at 09:19

## 2024-05-15 RX ADMIN — MOMETASONE FUROATE AND FORMOTEROL FUMARATE DIHYDRATE 2 PUFF: 100; 5 AEROSOL RESPIRATORY (INHALATION) at 07:27

## 2024-05-15 RX ADMIN — Medication 2 PUFF: at 07:27

## 2024-05-15 RX ADMIN — SODIUM CHLORIDE 250 ML: 9 INJECTION, SOLUTION INTRAVENOUS at 02:11

## 2024-05-15 RX ADMIN — PANTOPRAZOLE SODIUM 40 MG: 40 TABLET, DELAYED RELEASE ORAL at 06:26

## 2024-05-15 ASSESSMENT — PAIN SCALES - GENERAL: PAINLEVEL_OUTOF10: 0

## 2024-05-15 NOTE — PLAN OF CARE
Problem: Chronic Conditions and Co-morbidities  Goal: Patient's chronic conditions and co-morbidity symptoms are monitored and maintained or improved  Outcome: Not Progressing     Problem: Musculoskeletal - Adult  Goal: Return mobility to safest level of function  Outcome: Not Progressing     Problem: Gastrointestinal - Adult  Goal: Maintains adequate nutritional intake  Outcome: Not Progressing     Problem: Genitourinary - Adult  Goal: Absence of urinary retention  Outcome: Not Progressing     Problem: Metabolic/Fluid and Electrolytes - Adult  Goal: Electrolytes maintained within normal limits  Outcome: Not Progressing  Goal: Hemodynamic stability and optimal renal function maintained  Outcome: Not Progressing     Problem: Pain  Goal: Verbalizes/displays adequate comfort level or baseline comfort level  Outcome: Progressing  Flowsheets (Taken 5/13/2024 1700 by Elisha Montelongo RN)  Verbalizes/displays adequate comfort level or baseline comfort level: Encourage patient to monitor pain and request assistance     Problem: Skin/Tissue Integrity  Goal: Absence of new skin breakdown  Description: 1.  Monitor for areas of redness and/or skin breakdown  2.  Assess vascular access sites hourly  3.  Every 4-6 hours minimum:  Change oxygen saturation probe site  4.  Every 4-6 hours:  If on nasal continuous positive airway pressure, respiratory therapy assess nares and determine need for appliance change or resting period.  Outcome: Progressing     Problem: Safety - Adult  Goal: Free from fall injury  Outcome: Progressing     Problem: Neurosensory - Adult  Goal: Achieves stable or improved neurological status  Outcome: Progressing     Problem: Respiratory - Adult  Goal: Achieves optimal ventilation and oxygenation  Outcome: Progressing     Problem: Cardiovascular - Adult  Goal: Maintains optimal cardiac output and hemodynamic stability  Outcome: Progressing     Problem: Skin/Tissue Integrity - Adult  Goal: Skin integrity 
  Problem: Chronic Conditions and Co-morbidities  Goal: Patient's chronic conditions and co-morbidity symptoms are monitored and maintained or improved  Outcome: Not Progressing     Problem: Neurosensory - Adult  Goal: Achieves stable or improved neurological status  Outcome: Not Progressing     Problem: Musculoskeletal - Adult  Goal: Return mobility to safest level of function  Outcome: Not Progressing     Problem: Gastrointestinal - Adult  Goal: Maintains or returns to baseline bowel function  Outcome: Not Progressing  Goal: Maintains adequate nutritional intake  Outcome: Not Progressing     Problem: Metabolic/Fluid and Electrolytes - Adult  Goal: Electrolytes maintained within normal limits  Outcome: Not Progressing  Goal: Hemodynamic stability and optimal renal function maintained  Outcome: Not Progressing     Problem: Confusion  Goal: Confusion, delirium, dementia, or psychosis is improved or at baseline  Description: INTERVENTIONS:  1. Assess for possible contributors to thought disturbance, including medications, impaired vision or hearing, underlying metabolic abnormalities, dehydration, psychiatric diagnoses, and notify attending LIP  2. [place] high risk fall precautions, as indicated  3. Provide frequent short contacts to provide reality reorientation, refocusing and direction  4. Decrease environmental stimuli, including noise as appropriate  5. Monitor and intervene to maintain adequate nutrition, hydration, elimination, sleep and activity  6. If unable to ensure safety without constant attention obtain sitter and review sitter guidelines with assigned personnel  7. Initiate Psychosocial CNS and Spiritual Care consult, as indicated  Outcome: Not Progressing     Problem: Pain  Goal: Verbalizes/displays adequate comfort level or baseline comfort level  Outcome: Progressing     Problem: Skin/Tissue Integrity  Goal: Absence of new skin breakdown  Description: 1.  Monitor for areas of redness and/or skin 
  Problem: Confusion  Goal: Confusion, delirium, dementia, or psychosis is improved or at baseline  Description: INTERVENTIONS:  1. Assess for possible contributors to thought disturbance, including medications, impaired vision or hearing, underlying metabolic abnormalities, dehydration, psychiatric diagnoses, and notify attending LIP  2. [place] high risk fall precautions, as indicated  3. Provide frequent short contacts to provide reality reorientation, refocusing and direction  4. Decrease environmental stimuli, including noise as appropriate  5. Monitor and intervene to maintain adequate nutrition, hydration, elimination, sleep and activity  6. If unable to ensure safety without constant attention obtain sitter and review sitter guidelines with assigned personnel  7. Initiate Psychosocial CNS and Spiritual Care consult, as indicated  Outcome: Progressing  Flowsheets (Taken 5/14/2024 1227)  Effect of thought disturbance (confusion, delirium, dementia, or psychosis) are managed with adequate functional status:   Assess for contributors to thought disturbance, including medications, impaired vision or hearing, underlying metabolic abnormalities, dehydration, psychiatric diagnoses, notify LIP   [place] high risk fall precautions, as indicated   Provide frequent short contacts to provide reality reorientation, refocusing and direction     Problem: Chronic Conditions and Co-morbidities  Goal: Patient's chronic conditions and co-morbidity symptoms are monitored and maintained or improved  5/14/2024 0729 by Elisha Montelongo, RN  Outcome: Progressing  Flowsheets (Taken 5/14/2024 0705)  Care Plan - Patient's Chronic Conditions and Co-Morbidity Symptoms are Monitored and Maintained or Improved: Monitor and assess patient's chronic conditions and comorbid symptoms for stability, deterioration, or improvement  5/14/2024 0513 by Lita Hills, RN  Outcome: Not Progressing     Problem: Musculoskeletal - Adult  Goal: 
  Problem: Pain  Goal: Verbalizes/displays adequate comfort level or baseline comfort level  Outcome: Progressing     Problem: Skin/Tissue Integrity  Goal: Absence of new skin breakdown  Description: 1.  Monitor for areas of redness and/or skin breakdown  2.  Assess vascular access sites hourly  3.  Every 4-6 hours minimum:  Change oxygen saturation probe site  4.  Every 4-6 hours:  If on nasal continuous positive airway pressure, respiratory therapy assess nares and determine need for appliance change or resting period.  Outcome: Progressing     Problem: Discharge Planning  Goal: Discharge to home or other facility with appropriate resources  Outcome: Progressing     Problem: Chronic Conditions and Co-morbidities  Goal: Patient's chronic conditions and co-morbidity symptoms are monitored and maintained or improved  Outcome: Progressing     Problem: Safety - Adult  Goal: Free from fall injury  Outcome: Progressing     Problem: Neurosensory - Adult  Goal: Achieves stable or improved neurological status  Outcome: Progressing     Problem: Respiratory - Adult  Goal: Achieves optimal ventilation and oxygenation  Outcome: Progressing     Problem: Cardiovascular - Adult  Goal: Maintains optimal cardiac output and hemodynamic stability  Outcome: Progressing     Problem: Skin/Tissue Integrity - Adult  Goal: Skin integrity remains intact  Outcome: Progressing     Problem: Musculoskeletal - Adult  Goal: Return mobility to safest level of function  Outcome: Progressing     
Progressing  Goal: Hemodynamic stability and optimal renal function maintained  5/14/2024 0729 by Elisha Montelongo RN  Outcome: Progressing  Flowsheets (Taken 5/14/2024 0705)  Hemodynamic stability and optimal renal function maintained:   Monitor labs and assess for signs and symptoms of volume excess or deficit   Monitor intake, output and patient weight  5/14/2024 0513 by Lita Hills RN  Outcome: Not Progressing  Goal: Glucose maintained within prescribed range  5/14/2024 0729 by Elisha Montelongo RN  Outcome: Progressing  Flowsheets (Taken 5/14/2024 0705)  Glucose maintained within prescribed range:   Monitor blood glucose as ordered   Assess for signs and symptoms of hyperglycemia and hypoglycemia  5/14/2024 0513 by Lita Hills RN  Outcome: Progressing     Problem: ABCDS Injury Assessment  Goal: Absence of physical injury  5/14/2024 0729 by Elisha Montelongo RN  Outcome: Progressing  5/14/2024 0513 by Lita Hills RN  Outcome: Progressing     Problem: Chronic Conditions and Co-morbidities  Goal: Patient's chronic conditions and co-morbidity symptoms are monitored and maintained or improved  5/14/2024 0729 by Elisha Montelongo RN  Outcome: Progressing  Flowsheets (Taken 5/14/2024 0705)  Care Plan - Patient's Chronic Conditions and Co-Morbidity Symptoms are Monitored and Maintained or Improved: Monitor and assess patient's chronic conditions and comorbid symptoms for stability, deterioration, or improvement  5/14/2024 0513 by Lita Hills RN  Outcome: Not Progressing     Problem: Musculoskeletal - Adult  Goal: Return mobility to safest level of function  5/14/2024 0729 by Elisha Montelongo RN  Outcome: Progressing  5/14/2024 0513 by Lita Hills RN  Outcome: Not Progressing     Problem: Gastrointestinal - Adult  Goal: Maintains adequate nutritional intake  5/14/2024 0729 by Elisha Montelongo RN  Outcome: Progressing  Flowsheets (Taken 5/14/2024 0705)  Maintains adequate

## 2024-05-15 NOTE — PROGRESS NOTES
07:30- Pt awake and alert. Pt states he is warm, adjustments made. Pt turned and repositioned Q2H this shift. RT in room with pt. Tape for NGT changed.     08:00- Wife at bedside.     09:20- MD in room with pt to assess and discuss POC.     09:40- Report given to RN at [facility] for room 236. Phone call disconnected mult times. Ph to [facility] provided. Unable to obtain RN name d/t disconnect.     10:05- [facility]  at bedside. Report given to transport team. Wife at bedside. All belongings packed.

## 2024-05-15 NOTE — PROGRESS NOTES
Physician Progress Note      PATIENT:               BETTY CANALES  CSN #:                  [id number]  :                       1955  ADMIT DATE:       2024 7:12 AM  DISCH DATE:        5/15/2024 10:15 AM  RESPONDING  PROVIDER #:        Dana VEE          QUERY TEXT:    Dana  Pt admitted with advanced liver cirrhosis, metabolic encephalopathy. Pt noted   to have UA+/urine culture +. If possible, please document in the progress   notes and discharge summary if you are evaluating and/or treating any of the   following:    The medical record reflects the following:  Risk Factors: advanced liver cirrhosis, hyponatremia chronic  Clinical Indicators: UA done on admission with large blood, moderate   leukocytes, ; urine culture +Gram negative rods 30,000/ml  Treatment: Rocephin, ivf@75cc/hr, Albumin  Options provided:  -- Urinary Tract Infection (UTI) POA  -- Bacteriuria  -- Other - I will add my own diagnosis  -- Disagree - Not applicable / Not valid  -- Disagree - Clinically unable to determine / Unknown  -- Refer to Clinical Documentation Reviewer    PROVIDER RESPONSE TEXT:    This patient has a UTI that was present on admission.    Query created by: Radha Nelson on 5/15/2024 9:03 AM      Electronically signed by:  Dana VEE 5/15/2024 1:42 PM

## 2024-05-15 NOTE — PROGRESS NOTES
-1850 Received care of pt from off going nurse.  Pt family members at pt bedside.    -1938 PM Assessment completed.  Pt lethargic at present.  Pt does respond to painful stimuli.  Resp even and non-labored.  Lung sounds diminished in all lung fields.  Skin warm and dry.  Skin pallor  pale and jaundice. Generalized bruising noted.  Abdomen soft and obese.   Hypoactive BS X4.  +1 pitting edema to BLE.  Panda patent, draining cloudy ginny colored urine.     -1950 NGT inserted into pt right nare. Placement verified with air bolus.     -2016 XRAY tech into do KUB    -2150 KUB results given to MICHAEL Lui NP.  New orders were received.    -2155  NGT advanced to 65 cm.    -2206 XRAY tech into do another KUB.    -2241 KUB results given to NP.  Per NP okay to use NGT.    -2248 NGT checked for air bolus.  Scheduled po pm meds given. Midodrine 10 mg via NGT per HAFSA Joel NP.  Pt incontinent of huge loose stool.  Pt cleaned, aloe vesta ointment applied to groin and sacrum areas.  Pt turned and repositioned for comfort.  CBWR, bed in lowest position. NAD noted.    -2310 Representative from bed control calling.  No beds available at [facility].      -0005 Pt resting quietly in bed with eyes closed.  Resp even and non-labored.  NAD noted.    -0120 Routine rounding on pt.  Pt has hands around NGT, attempting to pull out NGT.  Pt hands removed from NGT by this nurse.  This nurse educating pt on use of NGT.  Pt has flat affect.      -0125 Pt again attempting to pull out NGT.  This nurse educating pt on use of NGT.    -0130 Pt attempting to pull out NGT.  Pt being educated on use of NGT without any success.  Bilateral mittens applied.  MICHAEL Lui NP notified.    -0205 BP 86/38.  NP notified, new orders were received.    -0211 250 ml NS IV bolus hung per NP orders.    -0244 IV bolus completed.    -0315  into draw am labs.     -0500 Pt arousal at present.  Bilateral mittens intact.     -0626 NGT checked for placement with

## 2024-05-19 LAB
BACTERIA SPEC CULT: NORMAL
BACTERIA SPEC CULT: NORMAL
Lab: NORMAL
Lab: NORMAL